# Patient Record
Sex: FEMALE | Race: WHITE | NOT HISPANIC OR LATINO
[De-identification: names, ages, dates, MRNs, and addresses within clinical notes are randomized per-mention and may not be internally consistent; named-entity substitution may affect disease eponyms.]

---

## 2017-03-17 PROBLEM — Z00.00 ENCOUNTER FOR PREVENTIVE HEALTH EXAMINATION: Status: ACTIVE | Noted: 2017-03-17

## 2017-05-01 ENCOUNTER — APPOINTMENT (OUTPATIENT)
Dept: ANTEPARTUM | Facility: CLINIC | Age: 62
End: 2017-05-01

## 2017-05-23 ENCOUNTER — OUTPATIENT (OUTPATIENT)
Dept: OUTPATIENT SERVICES | Facility: HOSPITAL | Age: 62
LOS: 1 days | Discharge: HOME | End: 2017-05-23

## 2017-06-28 DIAGNOSIS — Z01.818 ENCOUNTER FOR OTHER PREPROCEDURAL EXAMINATION: ICD-10-CM

## 2017-06-28 DIAGNOSIS — N95.0 POSTMENOPAUSAL BLEEDING: ICD-10-CM

## 2024-03-17 ENCOUNTER — EMERGENCY (EMERGENCY)
Facility: HOSPITAL | Age: 69
LOS: 0 days | Discharge: HOME | End: 2024-03-17
Attending: STUDENT IN AN ORGANIZED HEALTH CARE EDUCATION/TRAINING PROGRAM
Payer: MEDICAID

## 2024-03-17 ENCOUNTER — RESULT REVIEW (OUTPATIENT)
Age: 69
End: 2024-03-17

## 2024-03-17 VITALS
OXYGEN SATURATION: 99 % | RESPIRATION RATE: 20 BRPM | DIASTOLIC BLOOD PRESSURE: 88 MMHG | HEART RATE: 76 BPM | SYSTOLIC BLOOD PRESSURE: 147 MMHG | TEMPERATURE: 98 F

## 2024-03-17 VITALS
DIASTOLIC BLOOD PRESSURE: 56 MMHG | HEART RATE: 71 BPM | TEMPERATURE: 98 F | RESPIRATION RATE: 20 BRPM | SYSTOLIC BLOOD PRESSURE: 124 MMHG | OXYGEN SATURATION: 96 %

## 2024-03-17 DIAGNOSIS — N95.0 POSTMENOPAUSAL BLEEDING: ICD-10-CM

## 2024-03-17 DIAGNOSIS — R10.30 LOWER ABDOMINAL PAIN, UNSPECIFIED: ICD-10-CM

## 2024-03-17 DIAGNOSIS — N93.9 ABNORMAL UTERINE AND VAGINAL BLEEDING, UNSPECIFIED: ICD-10-CM

## 2024-03-17 LAB
ALBUMIN SERPL ELPH-MCNC: 4.4 G/DL — SIGNIFICANT CHANGE UP (ref 3.5–5.2)
ALP SERPL-CCNC: 86 U/L — SIGNIFICANT CHANGE UP (ref 30–115)
ALT FLD-CCNC: 17 U/L — SIGNIFICANT CHANGE UP (ref 0–41)
ANION GAP SERPL CALC-SCNC: 14 MMOL/L — SIGNIFICANT CHANGE UP (ref 7–14)
APPEARANCE UR: CLEAR — SIGNIFICANT CHANGE UP
AST SERPL-CCNC: 30 U/L — SIGNIFICANT CHANGE UP (ref 0–41)
BASOPHILS # BLD AUTO: 0.03 K/UL — SIGNIFICANT CHANGE UP (ref 0–0.2)
BASOPHILS NFR BLD AUTO: 0.4 % — SIGNIFICANT CHANGE UP (ref 0–1)
BILIRUB SERPL-MCNC: 0.4 MG/DL — SIGNIFICANT CHANGE UP (ref 0.2–1.2)
BILIRUB UR-MCNC: NEGATIVE — SIGNIFICANT CHANGE UP
BUN SERPL-MCNC: 13 MG/DL — SIGNIFICANT CHANGE UP (ref 10–20)
CALCIUM SERPL-MCNC: 9.4 MG/DL — SIGNIFICANT CHANGE UP (ref 8.4–10.5)
CHLORIDE SERPL-SCNC: 102 MMOL/L — SIGNIFICANT CHANGE UP (ref 98–110)
CO2 SERPL-SCNC: 24 MMOL/L — SIGNIFICANT CHANGE UP (ref 17–32)
COLOR SPEC: YELLOW — SIGNIFICANT CHANGE UP
CREAT SERPL-MCNC: 0.9 MG/DL — SIGNIFICANT CHANGE UP (ref 0.7–1.5)
DIFF PNL FLD: ABNORMAL
EGFR: 70 ML/MIN/1.73M2 — SIGNIFICANT CHANGE UP
EOSINOPHIL # BLD AUTO: 0.1 K/UL — SIGNIFICANT CHANGE UP (ref 0–0.7)
EOSINOPHIL NFR BLD AUTO: 1.2 % — SIGNIFICANT CHANGE UP (ref 0–8)
GLUCOSE SERPL-MCNC: 90 MG/DL — SIGNIFICANT CHANGE UP (ref 70–99)
GLUCOSE UR QL: NEGATIVE MG/DL — SIGNIFICANT CHANGE UP
HCT VFR BLD CALC: 42 % — SIGNIFICANT CHANGE UP (ref 37–47)
HGB BLD-MCNC: 14 G/DL — SIGNIFICANT CHANGE UP (ref 12–16)
IMM GRANULOCYTES NFR BLD AUTO: 0.2 % — SIGNIFICANT CHANGE UP (ref 0.1–0.3)
KETONES UR-MCNC: ABNORMAL MG/DL
LEUKOCYTE ESTERASE UR-ACNC: ABNORMAL
LIDOCAIN IGE QN: 29 U/L — SIGNIFICANT CHANGE UP (ref 7–60)
LYMPHOCYTES # BLD AUTO: 2.36 K/UL — SIGNIFICANT CHANGE UP (ref 1.2–3.4)
LYMPHOCYTES # BLD AUTO: 28.2 % — SIGNIFICANT CHANGE UP (ref 20.5–51.1)
MCHC RBC-ENTMCNC: 28.1 PG — SIGNIFICANT CHANGE UP (ref 27–31)
MCHC RBC-ENTMCNC: 33.3 G/DL — SIGNIFICANT CHANGE UP (ref 32–37)
MCV RBC AUTO: 84.3 FL — SIGNIFICANT CHANGE UP (ref 81–99)
MONOCYTES # BLD AUTO: 0.7 K/UL — HIGH (ref 0.1–0.6)
MONOCYTES NFR BLD AUTO: 8.4 % — SIGNIFICANT CHANGE UP (ref 1.7–9.3)
NEUTROPHILS # BLD AUTO: 5.15 K/UL — SIGNIFICANT CHANGE UP (ref 1.4–6.5)
NEUTROPHILS NFR BLD AUTO: 61.6 % — SIGNIFICANT CHANGE UP (ref 42.2–75.2)
NITRITE UR-MCNC: NEGATIVE — SIGNIFICANT CHANGE UP
NRBC # BLD: 0 /100 WBCS — SIGNIFICANT CHANGE UP (ref 0–0)
PH UR: 6.5 — SIGNIFICANT CHANGE UP (ref 5–8)
PLATELET # BLD AUTO: 287 K/UL — SIGNIFICANT CHANGE UP (ref 130–400)
PMV BLD: 10.4 FL — SIGNIFICANT CHANGE UP (ref 7.4–10.4)
POTASSIUM SERPL-MCNC: 4.6 MMOL/L — SIGNIFICANT CHANGE UP (ref 3.5–5)
POTASSIUM SERPL-SCNC: 4.6 MMOL/L — SIGNIFICANT CHANGE UP (ref 3.5–5)
PROT SERPL-MCNC: 7.6 G/DL — SIGNIFICANT CHANGE UP (ref 6–8)
PROT UR-MCNC: SIGNIFICANT CHANGE UP MG/DL
RBC # BLD: 4.98 M/UL — SIGNIFICANT CHANGE UP (ref 4.2–5.4)
RBC # FLD: 13.9 % — SIGNIFICANT CHANGE UP (ref 11.5–14.5)
SODIUM SERPL-SCNC: 140 MMOL/L — SIGNIFICANT CHANGE UP (ref 135–146)
SP GR SPEC: 1.02 — SIGNIFICANT CHANGE UP (ref 1–1.03)
UROBILINOGEN FLD QL: 1 MG/DL — SIGNIFICANT CHANGE UP (ref 0.2–1)
WBC # BLD: 8.36 K/UL — SIGNIFICANT CHANGE UP (ref 4.8–10.8)
WBC # FLD AUTO: 8.36 K/UL — SIGNIFICANT CHANGE UP (ref 4.8–10.8)

## 2024-03-17 PROCEDURE — 76830 TRANSVAGINAL US NON-OB: CPT

## 2024-03-17 PROCEDURE — 74177 CT ABD & PELVIS W/CONTRAST: CPT | Mod: 26,MC

## 2024-03-17 PROCEDURE — 99285 EMERGENCY DEPT VISIT HI MDM: CPT

## 2024-03-17 PROCEDURE — 99284 EMERGENCY DEPT VISIT MOD MDM: CPT | Mod: 25

## 2024-03-17 PROCEDURE — 80053 COMPREHEN METABOLIC PANEL: CPT

## 2024-03-17 PROCEDURE — 58100 BIOPSY OF UTERUS LINING: CPT

## 2024-03-17 PROCEDURE — 36415 COLL VENOUS BLD VENIPUNCTURE: CPT

## 2024-03-17 PROCEDURE — 74177 CT ABD & PELVIS W/CONTRAST: CPT | Mod: MC

## 2024-03-17 PROCEDURE — 76830 TRANSVAGINAL US NON-OB: CPT | Mod: 26

## 2024-03-17 PROCEDURE — 85025 COMPLETE CBC W/AUTO DIFF WBC: CPT

## 2024-03-17 PROCEDURE — 83690 ASSAY OF LIPASE: CPT

## 2024-03-17 PROCEDURE — 88305 TISSUE EXAM BY PATHOLOGIST: CPT | Mod: 26,59

## 2024-03-17 PROCEDURE — 88305 TISSUE EXAM BY PATHOLOGIST: CPT

## 2024-03-17 PROCEDURE — 87086 URINE CULTURE/COLONY COUNT: CPT

## 2024-03-17 PROCEDURE — 81001 URINALYSIS AUTO W/SCOPE: CPT

## 2024-03-17 NOTE — ED ADULT NURSE NOTE - OBJECTIVE STATEMENT
Pt c/o vaginal bleeding intermittently x 6 years. Did not follow up with her OBGYN accompanied by abd pain.

## 2024-03-17 NOTE — ED ADULT NURSE NOTE - NSFALLUNIVINTERV_ED_ALL_ED
Bed/Stretcher in lowest position, wheels locked, appropriate side rails in place/Call bell, personal items and telephone in reach/Instruct patient to call for assistance before getting out of bed/chair/stretcher/Non-slip footwear applied when patient is off stretcher/[place] to call system/Physically safe environment - no spills, clutter or unnecessary equipment/Purposeful proactive rounding/Room/bathroom lighting operational, light cord in reach

## 2024-03-17 NOTE — ED PROVIDER NOTE - OBJECTIVE STATEMENT
Patient is a 69-year-old female with no past medical history who is Thai speaking presents with daughter who is translating for evaluation of continuous vaginal bleeding over the past 2 weeks.  They know that patient has had on and off vaginal bleeding for 5 to 6 years.  They had initially followed up with [facility]YN for an ultrasound but did not follow-up for further testing or results.  She admits to some lower abdominal discomfort and pressure.  Patient wearing pads and notes continued bleeding with clots.  She denies any fever, cough, sugar, chills, nausea, vomiting, chest pain, shortness of breath, urinary complaints.

## 2024-03-17 NOTE — ED PROVIDER NOTE - PATIENT/CAREGIVER ACCEPTED INTERPRETER SERVICES
Addendum  created 04/05/18 0904 by Goran TALAVERA CRNA    Anesthesia Intra Flowsheets edited       yes

## 2024-03-17 NOTE — ED PROVIDER NOTE - NSFOLLOWUPINSTRUCTIONS_ED_ALL_ED_FT
FOLLOW UP WITH A [facility] OB GYN     Our Emergency Department Referral Coordinators will be reaching out to you in the next 24-48 hours from 9:00am to 5:00pm with a follow up appointment. Please expect a phone call from the hospital in that time frame. If you do not receive a call or if you have any questions or concerns, you can reach them at   [phone]    Abnormal Uterine Bleeding  Abnormal uterine bleeding is unusual bleeding from the uterus. It includes:    Bleeding or spotting between periods.  Bleeding after sex.  Bleeding that is heavier than normal.  Periods that last longer than usual.  Bleeding after menopause.    Abnormal uterine bleeding can affect women at various stages in life, including teenagers, women in their reproductive years, pregnant women, and women who have reached menopause. Common causes of abnormal uterine bleeding include:    Pregnancy.  Growths of tissue (polyps).  A noncancerous tumor in the uterus (fibroid).  Infection.  Cancer.  Hormonal imbalances.    ImageAny type of abnormal bleeding should be evaluated by a health care provider. Many cases are minor and simple to treat, while others are more serious. Treatment will depend on the cause of the bleeding.    Follow these instructions at home:  Monitor your condition for any changes.  Do not use tampons, douche, or have sex if told by your health care provider.  Change your pads often.  Get regular exams that include pelvic exams and cervical cancer screening.  Keep all follow-up visits as told by your health care provider. This is important.  Contact a health care provider if:  Your bleeding lasts for more than one week.  You feel dizzy at times.  You feel nauseous or you vomit.  Get help right away if:  You pass out.  Your bleeding soaks through a pad every hour.  You have abdominal pain.  You have a fever.  You become sweaty or weak.  You pass large blood clots from your vagina.  Summary  Abnormal uterine bleeding is unusual bleeding from the uterus.  Any type of abnormal bleeding should be evaluated by a health care provider. Many cases are minor and simple to treat, while others are more serious.  Treatment will depend on the cause of the bleeding.  This information is not intended to replace advice given to you by your health care provider. Make sure you discuss any questions you have with your health care provider.

## 2024-03-17 NOTE — CONSULT NOTE ADULT - ASSESSMENT
68yo P3, postmenopausal, no sig PMHx, with PMB, s/p endometrial and cervical biopsies hemodynamically and clinically stable     - No acute gyn intervention   - F/u Endometrial and Cervical biopsies   - f/u CT read   - Recommend close follow up with Gyn; will refer to Gyn Onc if biopsies are positive for malignancy   - Dispo per FUAD Caruso and Dr. Adorno.   68yo P3, postmenopausal, no sig PMHx, with PMB, s/p endometrial and cervical biopsies hemodynamically and clinically stable     - No acute gyn intervention   - F/u Endometrial and Cervical biopsies   - f/u CT read   - Recommend close follow up with Gyn; will refer to Gyn Onc if biopsies are positive for malignancy   - Please provide [facility] information on Discharge for f/u in clinic in 1-2 weeks   - Dispo per ED    Rodríguez Caruso and Dr. Adorno.

## 2024-03-17 NOTE — CONSULT NOTE ADULT - SUBJECTIVE AND OBJECTIVE BOX
Chief Complaint: vaginal bleeding    Welsh speaking, son at bedside to translate    HPI:  70yo P3, postmenopausal, with no significant PMHx, presenting to ED For 2 week of vaginal bleeding.  States the bleeding is like a period, moderately heavy.  Went through menopause at age 48yo with a few episodes of spotting but nothing like this before.  Has not seen a Gyn in 5-6 years.  Also reporting fatigue and decreased appetite.  Denies any change in weight, night sweats. Denies family history cancer.      Ob/Gyn History:  P3, NSVDx3   Denies history of ovarian cysts, uterine fibroids, abnormal paps, or STIs    Denies the following: constitutional symptoms, visual symptoms, cardiovascular symptoms, respiratory symptoms, GI symptoms, musculoskeletal symptoms, skin symptoms, neurologic symptoms, hematologic symptoms, allergic symptoms, psychiatric symptoms  Except any pertinent positives listed.     Home Medications:      Allergies  No Known Allergies  Intolerances    PAST MEDICAL & SURGICAL HISTORY:      FAMILY HISTORY:      SOCIAL HISTORY: Denies cigarette use, alcohol use, or illicit drug use    Vital Signs Last 24 Hrs  T(F): 97.9 (17 Mar 2024 15:51), Max: 98.3 (17 Mar 2024 12:39)  HR: 71 (17 Mar 2024 15:51) (71 - 76)  BP: 124/56 (17 Mar 2024 15:51) (124/56 - 147/88)  RR: 20 (17 Mar 2024 15:51) (20 - 20)    General Appearance - AAOx3, NAD    Abdomen - Soft, distended, + fluid wave,  nondistended, no rebound, no rigidity, no guarding, bowel sounds present    GYN/Pelvis:  Vagina - normal vaginal mucosa   Cervix - small polypoid tissue on cervix   Uterus - limited bimanual due to habitus, anteverted   Adnexa - nontender, no appreciable masses.     R/B/A of endometrial biopsy discussed with patient, including bleeding, infection, damage to uterus. All questions answered, consent signed.  Patient placed in lithotomy position. Speculum inserted into the vagina to visualize the cervix, cervix prepped with betadine. Cervix grasped at 12 o'clock position with ring forceps.  Uterus sounded to 8cm with endometrial biopsy pipelle.  Pipelle passed 3x obtained moderate endometrial tissue.  Small cervical polypoid like lesion noted, and removed with ring forceps.  Good hemostasis.  Patient tolerated procedure well.     Meds:         LABS:                        14.0   8.36  )-----------( 287      ( 17 Mar 2024 14:00 )             42.0         03-17    140  |  102  |  13  ----------------------------<  90  4.6   |  24  |  0.9    Ca    9.4      17 Mar 2024 14:00    TPro  7.6  /  Alb  4.4  /  TBili  0.4  /  DBili  x   /  AST  30  /  ALT  17  /  AlkPhos  86        Urinalysis Basic - ( 17 Mar 2024 14:01 )    Color: Yellow / Appearance: Clear / S.023 / pH: x  Gluc: x / Ketone: Trace mg/dL  / Bili: Negative / Urobili: 1.0 mg/dL   Blood: x / Protein: Trace mg/dL / Nitrite: Negative   Leuk Esterase: Small / RBC: 168 /HPF / WBC 13 /HPF   Sq Epi: x / Non Sq Epi: 5 /HPF / Bacteria: Occasional /HPF    RADIOLOGY & ADDITIONAL STUDIES:  < from: US Transvaginal (24 @ 14:59) >    ACC: [id number] EXAM:  US TRANSVAGINAL   ORDERED BY: SAMY REN     PROCEDURE DATE:  2024          INTERPRETATION:  CLINICAL INFORMATION: Postmenopausal vaginal bleeding.    LMP: Postmenopausal    COMPARISON: Pelvic sonogram 2017    TECHNIQUE:  Endovaginal and transabdominal pelvic sonogram. Color and Spectral   Doppler was performed.    FINDINGS:    Uterus: 8.4 cm x 3.7 cm x 4.5 cm.  Endometrium: 17 mm. Echogenic focus in the endometrium measuring   approximately 5 mm with suggestionof vascular flow, possibly represent a   polyp.    Right ovary: 1.7 cm x 1.1 cm x 1.5 cm. Color Doppler flow is demonstrated.  Left ovary: 2.0 cm x 1.3 cm x 2.0 cm. Color Doppler flow is demonstrated.    Fluid: None.    IMPRESSION:    Thickened endometrium measuring approximately 17 mm with suggestion of a   5 mm endometrial polyp. A GYN evaluation is suggested.    --- End of Report ---          CIELO CORTEZ MD; Resident Radiologist  This document has been electronically signed.  PALOMO IRIZARRY MD; Attending Radiologist  This document has been electronically signed. Mar 17 2024  5:08PM    < end of copied text >

## 2024-03-17 NOTE — ED PROVIDER NOTE - PROGRESS NOTE DETAILS
KA - GYN cleared patient and sent biopsies. Will dc with outpatient [facility] ob gyn follow up.

## 2024-03-17 NOTE — ED PROVIDER NOTE - ATTENDING APP SHARED VISIT CONTRIBUTION OF CARE
69-year-old female with no past medical history who presents with vaginal bleeding.  Patient states that for the last 5 to 6 years she has been having vaginal bleeding on and off.  Patient has not followed up with her OB/GYN in the last 5 to 6 years or primary care doctor.  Patient also states that in the last couple of days she has been having worsening lower abdominal pain.  Patient denies any other medical complaints.    VITAL SIGNS: I have reviewed nursing notes and confirm.  CONSTITUTIONAL: non-toxic, well appearing  SKIN: no rash, no petechiae.  EYES:  EOMI, pink conjunctiva, anicteric  ENT: tongue midline, no exudates, MMM  NECK: Supple; no meningismus, no JVD  CARD: RRR, no murmurs, equal radial pulses bilaterally 2+  RESP: CTAB, no respiratory distress  ABD: Soft, non-tender, non-distended, no peritoneal signs, no HSM, no CVA tenderness  : mild blood in the vaginal vault, no cmt or adnexa tenderness, however, at 1-2 o clock susan is a small 1 cm mass noted at the cervix. os closed (KAREEM Mcghee present during exam)         69-year-old female that presents with vaginal bleeding.  Imaging UA labs reassess dispo pending

## 2024-03-17 NOTE — ED PROVIDER NOTE - PATIENT PORTAL LINK FT
You can access the FollowMyHealth Patient Portal offered by [facility] by registering at the following website: [email or web address]. By joining [facility]’s FollowMyHealth portal, you will also be able to view your health information using other applications (apps) compatible with our system.

## 2024-03-17 NOTE — ED PROVIDER NOTE - PHYSICAL EXAMINATION
As Follows:  CONST: Well appearing in NAD  EYES: PERRL, EOMI, Sclera and conjunctiva clear.   CARD: No murmurs, rubs, or gallops; Normal rate and rhythm  RESP: BS Equal B/L, No wheezes, rhonchi or rales. No distress or accessory breathing  GI: Soft, non-tender, non-distended.  : See Attending note for further description.   SKIN: Warm, dry, no acute rashes. MMM  NEURO: A&Ox3, No focal deficits. Strength and sensation intact. Steady gait

## 2024-03-17 NOTE — ED PROVIDER NOTE - CLINICAL SUMMARY MEDICAL DECISION MAKING FREE TEXT BOX
69-year-old female that presents with vaginal bleeding.  Imaging UA labs. ob/gyn evaluated and cleared pt. pt hemodynamically stable. Labs were ordered and reviewed.  Imaging was ordered and reviewed by me.  Appropriate medications for patient's presenting complaints were ordered and effects were reassessed.  Patient's records (prior hospital, ED visit, and/or nursing home notes if available) were reviewed.  Additional history was obtained from EMS, family, and/or PCP (where available).  Escalation to admission/observation was considered. However patient feels much better and is comfortable with discharge.  Appropriate follow-up was arranged.     I have discussed the discharge plan with the patient. The patient agrees with the plan, as discussed.  The patient understands Emergency Department diagnosis is a preliminary diagnosis often based on limited information and that the patient must adhere to the follow-up plan as discussed.  The patient understands that if the symptoms worsen or if prescribed medications do not have the desired/planned effect that the patient may return to the Emergency Department at any time for further evaluation and treatment.

## 2024-03-17 NOTE — ED PROVIDER NOTE - NSFOLLOWUPCLINICS_GEN_ALL_ED_FT
[facility]  OB/GYN  [address] [place] [address]  Phone: [phone]  Fax:   Follow Up Time: 7-10 Days

## 2024-03-17 NOTE — ED PROVIDER NOTE - ADDITIONAL NOTES AND INSTRUCTIONS:
Ms Early was seen in the Emergency Department on 3/17/24 and can return to school or work by the listed date with activity as tolerated.

## 2024-03-18 LAB
CULTURE RESULTS: SIGNIFICANT CHANGE UP
SPECIMEN SOURCE: SIGNIFICANT CHANGE UP

## 2024-03-20 LAB — SURGICAL PATHOLOGY STUDY: SIGNIFICANT CHANGE UP

## 2024-03-27 ENCOUNTER — APPOINTMENT (OUTPATIENT)
Dept: OBGYN | Facility: CLINIC | Age: 69
End: 2024-03-27
Payer: MEDICAID

## 2024-03-27 ENCOUNTER — OUTPATIENT (OUTPATIENT)
Dept: OUTPATIENT SERVICES | Facility: HOSPITAL | Age: 69
LOS: 1 days | End: 2024-03-27
Payer: MEDICAID

## 2024-03-27 VITALS
WEIGHT: 202 LBS | HEIGHT: 57 IN | DIASTOLIC BLOOD PRESSURE: 73 MMHG | SYSTOLIC BLOOD PRESSURE: 136 MMHG | BODY MASS INDEX: 43.58 KG/M2

## 2024-03-27 DIAGNOSIS — N95.0 POSTMENOPAUSAL BLEEDING: ICD-10-CM

## 2024-03-27 DIAGNOSIS — Z00.00 ENCOUNTER FOR GENERAL ADULT MEDICAL EXAMINATION WITHOUT ABNORMAL FINDINGS: ICD-10-CM

## 2024-03-27 PROCEDURE — 99203 OFFICE O/P NEW LOW 30 MIN: CPT

## 2024-03-27 PROCEDURE — 99213 OFFICE O/P EST LOW 20 MIN: CPT

## 2024-03-27 NOTE — HISTORY OF PRESENT ILLNESS
[FreeTextEntry1] : Accompanied by daughter, Lyn.  Patient is Comoran speaking only.   70yo P3, postmenopausal, no known medical problems, presenting for ED follow up for PMB.  Seen in ED on 3/17 for 2 weeks of vaginal bleeding.  States the bleeding continued for another week.  She has had 2 days free from vaginal bleeding.  Bleeding was moderately heavy.  Went through menopause at 46yo, had had episodes of vaginal spotting in past, no biopsies.  Has not seen Gyn in 5-6 years. Reports fatigue and decreased appetite.   Denies any change in weight, night sweats. Denies family history cancer.  EMBx in ED:  1. Cervix biopsy: - Ectocervical squamous mucosa with no significant pathological changes. 2. Endometrial biopsy: -  Fragments of polypoid endometrium with thick-walled blood vessels and inactive glands, suggestive of endometrial polyp. - Microglandular hyperplasia with squamous metaplasia and scant unremarkable endocervical glands. - Abundant background blood clots with fibrin degradation and inflammatory cells.  Was seen by Dr. Mckeon yesterday (3/26) who did exam and pap smear.    OBHx: P3, NSVDs  GynHx: no gynecologic care for 5 years. denies h/o fibroids, cysts, abnormal paps, STIs [Post-Menopause, No Sxs] : post-menopausal, currently without symptoms [No] : Patient does not have concerns regarding sex

## 2024-04-01 ENCOUNTER — OUTPATIENT (OUTPATIENT)
Dept: OUTPATIENT SERVICES | Facility: HOSPITAL | Age: 69
LOS: 1 days | End: 2024-04-01
Payer: MEDICAID

## 2024-04-01 VITALS
OXYGEN SATURATION: 96 % | SYSTOLIC BLOOD PRESSURE: 148 MMHG | HEART RATE: 66 BPM | WEIGHT: 201.06 LBS | RESPIRATION RATE: 18 BRPM | DIASTOLIC BLOOD PRESSURE: 90 MMHG | HEIGHT: 60 IN | TEMPERATURE: 97 F

## 2024-04-01 DIAGNOSIS — Z01.818 ENCOUNTER FOR OTHER PREPROCEDURAL EXAMINATION: ICD-10-CM

## 2024-04-01 DIAGNOSIS — Z98.890 OTHER SPECIFIED POSTPROCEDURAL STATES: Chronic | ICD-10-CM

## 2024-04-01 DIAGNOSIS — N95.0 POSTMENOPAUSAL BLEEDING: ICD-10-CM

## 2024-04-01 PROCEDURE — 93005 ELECTROCARDIOGRAM TRACING: CPT

## 2024-04-01 PROCEDURE — 93010 ELECTROCARDIOGRAM REPORT: CPT

## 2024-04-01 PROCEDURE — 99214 OFFICE O/P EST MOD 30 MIN: CPT | Mod: 25

## 2024-04-01 NOTE — H&P PST ADULT - NSICDXPASTSURGICALHX_GEN_ALL_CORE_FT
PAST SURGICAL HISTORY:  No significant past surgical history PAST SURGICAL HISTORY:  H/O cervical biopsy

## 2024-04-01 NOTE — H&P PST ADULT - HISTORY OF PRESENT ILLNESS
70 Y/O FEMALE PT TO PAST WITH C/O POSTMENOPAUSAL BLEEDING,    FOR              PT NOW FOR SCHEDULED PROCEDURE ( HYSTEROSCOPY, D & C, REMOVAL OF PYLP FIBROID) . PT DENIES ANY CP SOB PALP COUGH DYSURIA FEVER URI.   Anesthesia Alert  NO--Difficult Airway  NO--History of neck surgery or radiation  NO--Limited ROM of neck  NO--History of Malignant hyperthermia  NO--Personal or family history of Pseudocholinesterase deficiency.  NO--Prior Anesthesia Complication  NO--Latex Allergy  NO--Loose teeth  NO--History of Rheumatoid Arthritis  NO--MARY  NO--Bleeding risk  NO--Other_____   68 Y/O FEMALE PT TO PAST WITH C/O POSTMENOPAUSAL BLEEDING SINCE 2017 , PT C/O RECENT BLEEDING PAST 2 WEEKS PT NOW FOR SCHEDULED PROCEDURE ( HYSTEROSCOPY, D & C, REMOVAL OF PYLP FIBROID) . PT DENIES ANY CP SOB PALP COUGH DYSURIA FEVER URI.   Anesthesia Alert  NO--Difficult Airway  NO--History of neck surgery or radiation  NO--Limited ROM of neck  NO--History of Malignant hyperthermia  NO--Personal or family history of Pseudocholinesterase deficiency.  NO--Prior Anesthesia Complication  NO--Latex Allergy  NO--Loose teeth  NO--History of Rheumatoid Arthritis  NO--MARY  NO--Bleeding risk  NO--Other_____  Revised Cardiac Risk Index for Pre-Operative Risk from [employer]  on 4/1/2024  ** All calculations should be rechecked by clinician prior to use **    RESULT SUMMARY:  0 points  Class I Risk    3.9 %  30-day risk of death, MI, or cardiac arrest    From [employer] 2017. These numbers are higher than those from the original study (Desmond 1999). See Evidence for details.      INPUTS:  Elevated-risk surgery —> 0 = No  History of ischemic heart disease —> 0 = No  History of congestive heart failure —> 0 = No  History of cerebrovascular disease —> 0 = No  Pre-operative treatment with insulin —> 0 = No  Pre-operative creatinine >2 mg/dL / 176.8 µmol/L —> 0 = No  Duke Activity Status Index (DASI) from [employer]  on 4/1/2024  ** All calculations should be rechecked by clinician prior to use **    RESULT SUMMARY:  42.45 points  The higher the score (maximum 58.2), the higher the functional status.    7.96 METs        INPUTS:  Take care of self —> 2.75 = Yes  Walk indoors —> 1.75 = Yes  Walk 1&ndash;2 blocks on level ground —> 2.75 = Yes  Climb a flight of stairs or walk up a hill —> 5.5 = Yes  Run a short distance —> 8 = Yes  Do light work around the house —> 2.7 = Yes  Do moderate work around the house —> 3.5 = Yes  Do heavy work around the house —> 8 = Yes  Do yardwork —> 0 = No  Have sexual relations —> 0 = No  Participate in moderate recreational activities —> 0 = No  Participate in strenuous sports —> 7.5 = Yes   70 Y/O FEMALE PT TO PAST WITH C/O POSTMENOPAUSAL BLEEDING SINCE 2017 , PT C/O RECENT BLEEDING PAST 2 WEEKS PT NOW FOR SCHEDULED PROCEDURE ( HYSTEROSCOPY, D & C, REMOVAL OF PYLP FIBROID) . PT DENIES ANY CP SOB PALP COUGH DYSURIA FEVER URI.   Anesthesia Alert  NO--Difficult Airway  NO--History of neck surgery or radiation  NO--Limited ROM of neck  NO--History of Malignant hyperthermia  NO--Personal or family history of Pseudocholinesterase deficiency.  NO--Prior Anesthesia Complication  NO--Latex Allergy  NO--Loose teeth  NO--History of Rheumatoid Arthritis  NO--MARY  NO--Bleeding risk  NO--Other_____  Revised Cardiac Risk Index for Pre-Operative Risk from [employer]  on 4/1/2024  ** All calculations should be rechecked by clinician prior to use **    RESULT SUMMARY:  0 points  Class I Risk    3.9 %  30-day risk of death, MI, or cardiac arrest    From [employer] 2017. These numbers are higher than those from the original study ([employer] 1999). See Evidence for details.      INPUTS:  Elevated-risk surgery —> 0 = No  History of ischemic heart disease —> 0 = No  History of congestive heart failure —> 0 = No  History of cerebrovascular disease —> 0 = No  Pre-operative treatment with insulin —> 0 = No  Pre-operative creatinine >2 mg/dL / 176.8 µmol/L —> 0 = No  Duke Activity Status Index (DASI) from [employer]  on 4/1/2024  ** All calculations should be rechecked by clinician prior to use **    RESULT SUMMARY:  42.45 points  The higher the score (maximum 58.2), the higher the functional status.    7.96 METs        INPUTS:  Take care of self —> 2.75 = Yes  Walk indoors —> 1.75 = Yes  Walk 1&ndash;2 blocks on level ground —> 2.75 = Yes  Climb a flight of stairs or walk up a hill —> 5.5 = Yes  Run a short distance —> 8 = Yes  Do light work around the house —> 2.7 = Yes  Do moderate work around the house —> 3.5 = Yes  Do heavy work around the house —> 8 = Yes  Do yardwork —> 0 = No  Have sexual relations —> 0 = No  Participate in moderate recreational activities —> 0 = No  Participate in strenuous sports —> 7.5 = Yes   68 Y/O FEMALE PT TO PAST, ACCOMPANIED BY DAUGHTER,  WITH C/O POSTMENOPAUSAL BLEEDING SINCE 2017 , PT C/O RECENT BLEEDING PAST 2 WEEKS PT NOW FOR SCHEDULED PROCEDURE ( HYSTEROSCOPY, D & C, REMOVAL OF POLYP FIBROID) . PT DENIES ANY CP SOB PALP COUGH DYSURIA FEVER URI.   Anesthesia Alert  NO--Difficult Airway  NO--History of neck surgery or radiation  NO--Limited ROM of neck  NO--History of Malignant hyperthermia  NO--Personal or family history of Pseudocholinesterase deficiency.  NO--Prior Anesthesia Complication  NO--Latex Allergy  NO--Loose teeth  NO--History of Rheumatoid Arthritis  NO--MARY  NO--Bleeding risk  NO--Other_____  Revised Cardiac Risk Index for Pre-Operative Risk from [employer]  on 4/1/2024  ** All calculations should be rechecked by clinician prior to use **    RESULT SUMMARY:  0 points  Class I Risk    3.9 %  30-day risk of death, MI, or cardiac arrest    From [employer] 2017. These numbers are higher than those from the original study (Desmond 1999). See Evidence for details.      INPUTS:  Elevated-risk surgery —> 0 = No  History of ischemic heart disease —> 0 = No  History of congestive heart failure —> 0 = No  History of cerebrovascular disease —> 0 = No  Pre-operative treatment with insulin —> 0 = No  Pre-operative creatinine >2 mg/dL / 176.8 µmol/L —> 0 = No  Duke Activity Status Index (DASI) from [employer]  on 4/1/2024  ** All calculations should be rechecked by clinician prior to use **    RESULT SUMMARY:  42.45 points  The higher the score (maximum 58.2), the higher the functional status.    7.96 METs        INPUTS:  Take care of self —> 2.75 = Yes  Walk indoors —> 1.75 = Yes  Walk 1&ndash;2 blocks on level ground —> 2.75 = Yes  Climb a flight of stairs or walk up a hill —> 5.5 = Yes  Run a short distance —> 8 = Yes  Do light work around the house —> 2.7 = Yes  Do moderate work around the house —> 3.5 = Yes  Do heavy work around the house —> 8 = Yes  Do yardwork —> 0 = No  Have sexual relations —> 0 = No  Participate in moderate recreational activities —> 0 = No  Participate in strenuous sports —> 7.5 = Yes

## 2024-04-02 DIAGNOSIS — N95.0 POSTMENOPAUSAL BLEEDING: ICD-10-CM

## 2024-04-02 DIAGNOSIS — Z01.818 ENCOUNTER FOR OTHER PREPROCEDURAL EXAMINATION: ICD-10-CM

## 2024-04-09 ENCOUNTER — OUTPATIENT (OUTPATIENT)
Dept: OUTPATIENT SERVICES | Facility: HOSPITAL | Age: 69
LOS: 1 days | Discharge: HOME | End: 2024-04-09
Payer: MEDICAID

## 2024-04-09 ENCOUNTER — TRANSCRIPTION ENCOUNTER (OUTPATIENT)
Age: 69
End: 2024-04-09

## 2024-04-09 ENCOUNTER — RESULT REVIEW (OUTPATIENT)
Age: 69
End: 2024-04-09

## 2024-04-09 VITALS
RESPIRATION RATE: 18 BRPM | HEART RATE: 61 BPM | DIASTOLIC BLOOD PRESSURE: 75 MMHG | SYSTOLIC BLOOD PRESSURE: 144 MMHG | OXYGEN SATURATION: 98 %

## 2024-04-09 VITALS
DIASTOLIC BLOOD PRESSURE: 63 MMHG | HEART RATE: 70 BPM | SYSTOLIC BLOOD PRESSURE: 132 MMHG | TEMPERATURE: 98 F | OXYGEN SATURATION: 96 % | WEIGHT: 201.06 LBS | RESPIRATION RATE: 18 BRPM | HEIGHT: 60 IN

## 2024-04-09 DIAGNOSIS — N95.0 POSTMENOPAUSAL BLEEDING: ICD-10-CM

## 2024-04-09 DIAGNOSIS — Z98.890 OTHER SPECIFIED POSTPROCEDURAL STATES: Chronic | ICD-10-CM

## 2024-04-09 PROCEDURE — 88305 TISSUE EXAM BY PATHOLOGIST: CPT

## 2024-04-09 PROCEDURE — 88305 TISSUE EXAM BY PATHOLOGIST: CPT | Mod: 26

## 2024-04-09 PROCEDURE — 58558 HYSTEROSCOPY BIOPSY: CPT

## 2024-04-09 RX ORDER — ACETAMINOPHEN 500 MG
650 TABLET ORAL ONCE
Refills: 0 | Status: DISCONTINUED | OUTPATIENT
Start: 2024-04-09 | End: 2024-04-09

## 2024-04-09 RX ORDER — HYDROMORPHONE HYDROCHLORIDE 2 MG/ML
0.5 INJECTION INTRAMUSCULAR; INTRAVENOUS; SUBCUTANEOUS
Refills: 0 | Status: DISCONTINUED | OUTPATIENT
Start: 2024-04-09 | End: 2024-04-09

## 2024-04-09 RX ORDER — SODIUM CHLORIDE 9 MG/ML
1000 INJECTION, SOLUTION INTRAVENOUS
Refills: 0 | Status: DISCONTINUED | OUTPATIENT
Start: 2024-04-09 | End: 2024-04-09

## 2024-04-09 RX ORDER — ONDANSETRON 8 MG/1
4 TABLET, FILM COATED ORAL ONCE
Refills: 0 | Status: DISCONTINUED | OUTPATIENT
Start: 2024-04-09 | End: 2024-04-09

## 2024-04-09 NOTE — PRE-ANESTHESIA EVALUATION ADULT - NSPROPOSEDPROCEDFT_GEN_ALL_CORE
examination under anesthesia, diagnostic hysteroscopy with myosure, removal of endometrial polyp fibroid, dilation and curettage, possible laparoscopy

## 2024-04-09 NOTE — CHART NOTE - NSCHARTNOTEFT_GEN_A_CORE
PACU ANESTHESIA ADMISSION NOTE      Procedure: Hysteroscopy with dilation and curettage of uterus using MyoSure device      Post op diagnosis:  Postmenopause bleeding        ____  Intubated  TV:______       Rate: ______      FiO2: ______    _x___  Patent Airway    _x___  Full return of protective reflexes    _x__  Full recovery from anesthesia / back to baseline status    Vitals:            T:    97.7            BP :   139/67             R:  12            Sat:  95             P:  76      Mental Status:  _x___ Awake   _____ Alert   _____ Drowsy   _____ Sedated    Nausea/Vomiting:  _x___  NO       ______Yes,   See Post - Op Orders         Pain Scale (0-10):  __0___    Treatment: ____ None    __x__ See Post - Op/PCA Orders    Post - Operative Fluids:   __x__ Oral   ____ See Post - Op Orders    Plan: Discharge:   _x___Home       _____Floor     _____Critical Care    _____  Other:_________________    Comments:  No anesthesia issues or complications noted.  Discharge when criteria met.

## 2024-04-09 NOTE — BRIEF OPERATIVE NOTE - NSICDXBRIEFPROCEDURE_GEN_ALL_CORE_FT
PROCEDURES:  Hysteroscopy with dilation and curettage of uterus using MyoSure device 09-Apr-2024 10:44:23  Magdalena Aparicio

## 2024-04-09 NOTE — PRE-ANESTHESIA EVALUATION ADULT - NSANTHADDINFOFT_GEN_ALL_CORE
risks, benefits, alternatives, discussed with the patient and she agrees to proceed as planned. Patient seen, examined, consent obtained prior to transfer to OR

## 2024-04-09 NOTE — BRIEF OPERATIVE NOTE - OPERATION/FINDINGS
Small approx 6 week size anteverted uterus on bimanual exam. On hysteroscopy, polyploid tissue throughout, discrete polyps noted at the level of the internal cervicl os. Small clarifications noted on left lateral lower uterine segment. Bl ostia visualized. Nl appearing vagina and cervix. Small cystocele appreciated.

## 2024-04-09 NOTE — ASU DISCHARGE PLAN (ADULT/PEDIATRIC) - CLICK TO LAUNCH ORM
Pt sleeping comfortably at this time, respirations equal and unlabored. No needs or complaints at this time. Safety measures maintained, stretcher locked and in lowest position, both side rails up. Pending bed placement. Pt aware of plan, verbalized understanding. .

## 2024-04-09 NOTE — ASU DISCHARGE PLAN (ADULT/PEDIATRIC) - CARE PROVIDER_API CALL
Arlene Torres  Obstetrics and Gynecology  [address] [place] [id number]  Phone: [phone]  Fax: [phone]  Follow Up Time: 2 weeks

## 2024-04-10 PROBLEM — Z78.9 OTHER SPECIFIED HEALTH STATUS: Chronic | Status: ACTIVE | Noted: 2024-04-01

## 2024-04-11 LAB — SURGICAL PATHOLOGY STUDY: SIGNIFICANT CHANGE UP

## 2024-04-15 ENCOUNTER — APPOINTMENT (OUTPATIENT)
Dept: OBGYN | Facility: CLINIC | Age: 69
End: 2024-04-15
Payer: MEDICAID

## 2024-04-15 ENCOUNTER — OUTPATIENT (OUTPATIENT)
Dept: OUTPATIENT SERVICES | Facility: HOSPITAL | Age: 69
LOS: 1 days | End: 2024-04-15
Payer: MEDICAID

## 2024-04-15 VITALS
WEIGHT: 203 LBS | BODY MASS INDEX: 43.8 KG/M2 | DIASTOLIC BLOOD PRESSURE: 80 MMHG | HEIGHT: 57 IN | SYSTOLIC BLOOD PRESSURE: 140 MMHG

## 2024-04-15 DIAGNOSIS — N72 INFLAMMATORY DISEASE OF CERVIX UTERI: ICD-10-CM

## 2024-04-15 DIAGNOSIS — N95.0 POSTMENOPAUSAL BLEEDING: ICD-10-CM

## 2024-04-15 DIAGNOSIS — N87.9 DYSPLASIA OF CERVIX UTERI, UNSPECIFIED: ICD-10-CM

## 2024-04-15 DIAGNOSIS — Z98.890 OTHER SPECIFIED POSTPROCEDURAL STATES: Chronic | ICD-10-CM

## 2024-04-15 DIAGNOSIS — N89.8 OTHER SPECIFIED NONINFLAMMATORY DISORDERS OF VAGINA: ICD-10-CM

## 2024-04-15 DIAGNOSIS — Z98.890 OTHER SPECIFIED POSTPROCEDURAL STATES: ICD-10-CM

## 2024-04-15 DIAGNOSIS — N84.0 POLYP OF CORPUS UTERI: ICD-10-CM

## 2024-04-15 PROCEDURE — 99213 OFFICE O/P EST LOW 20 MIN: CPT

## 2024-04-15 RX ORDER — METRONIDAZOLE 7.5 MG/G
0.75 GEL VAGINAL
Qty: 1 | Refills: 2 | Status: ACTIVE | COMMUNITY
Start: 2024-04-15 | End: 1900-01-01

## 2024-04-15 NOTE — HISTORY OF PRESENT ILLNESS
[FreeTextEntry1] : Pt s/p dx hysteroscopy on 4/9/24, here today for post op visit. Pt states she has vaginal odor. Denies fever or issues with urination or bowel movement. Pt had two episodes of spotting [Post-Menopause, No Sxs] : post-menopausal, currently without symptoms [No] : Patient does not have concerns regarding sex

## 2024-04-15 NOTE — PLAN
[FreeTextEntry1] : Pathology results discussed with patient and daughter who served as  rv for annual or prn

## 2024-04-15 NOTE — PHYSICAL EXAM
[Chaperone Present] : A chaperone was present in the examining room during all aspects of the physical examination [Appropriately responsive] : appropriately responsive [Alert] : alert [Oriented x3] : oriented x3

## 2024-04-17 DIAGNOSIS — N89.8 OTHER SPECIFIED NONINFLAMMATORY DISORDERS OF VAGINA: ICD-10-CM

## 2024-04-17 DIAGNOSIS — N95.0 POSTMENOPAUSAL BLEEDING: ICD-10-CM

## 2024-09-18 ENCOUNTER — APPOINTMENT (OUTPATIENT)
Dept: OTOLARYNGOLOGY | Facility: CLINIC | Age: 69
End: 2024-09-18

## 2024-09-25 ENCOUNTER — APPOINTMENT (OUTPATIENT)
Dept: OTOLARYNGOLOGY | Facility: CLINIC | Age: 69
End: 2024-09-25
Payer: MEDICAID

## 2024-09-25 DIAGNOSIS — H93.19 TINNITUS, UNSPECIFIED EAR: ICD-10-CM

## 2024-09-25 DIAGNOSIS — R42 DIZZINESS AND GIDDINESS: ICD-10-CM

## 2024-09-25 DIAGNOSIS — H92.01 OTALGIA, RIGHT EAR: ICD-10-CM

## 2024-09-25 DIAGNOSIS — L29.9 PRURITUS, UNSPECIFIED: ICD-10-CM

## 2024-09-25 PROCEDURE — 92557 COMPREHENSIVE HEARING TEST: CPT

## 2024-09-25 PROCEDURE — 92550 TYMPANOMETRY & REFLEX THRESH: CPT | Mod: 52

## 2024-09-25 PROCEDURE — 99204 OFFICE O/P NEW MOD 45 MIN: CPT

## 2024-09-25 RX ORDER — MOMETASONE FUROATE 1 MG/G
0.1 CREAM TOPICAL TWICE DAILY
Qty: 1 | Refills: 3 | Status: ACTIVE | COMMUNITY
Start: 2024-09-25 | End: 1900-01-01

## 2024-09-25 NOTE — HISTORY OF PRESENT ILLNESS
[de-identified] : Oren # [id number] (declined), prefers son to interpret.  [id number]  Patient presents today c/o ear pain and dizziness. C/o of R ear discomfort, went to PCP and was given abx ear drops with improvement but would like to know if further medication can be used.  Reports noise in L ear. She states that she is able to hear but having noise in ear. Denies asking others to repeat thsemlves.  C/o of lightheadedness and headaches, denies room spinning. No longer having dizzy symptoms. Symptoms started 3 years ago.  Accompanied by son.

## 2024-11-06 ENCOUNTER — APPOINTMENT (OUTPATIENT)
Dept: OTOLARYNGOLOGY | Facility: CLINIC | Age: 69
End: 2024-11-06

## 2025-05-15 NOTE — PLAN
[FreeTextEntry1] : This is a 61 y.o. F w/ PMHx non obstructive Coronary artery disease, Hepatic steatosis, HLD, GERD, Obesity who presents for wellness exam. Last OV 02/28/2025. Patient states that yesterday she was experiencing bilateral LE edema, more on the left than the right. Patient states she went to vascular doctor yesterday and performed a b/l LE venous duplex and states that there was no DVT noted. Patient states swelling has improved.  Patient following with Dr. Shearer and started her on Rezdiffra 100mg daily.    Denies chest pain, palpitations, diaphoresis, vision changes, HA, dizziness, syncope, cough, wheezing, fever, chills, infection.
[FreeTextEntry1] : 68yo P4 with PMB   PMB:  - EMBx with polypoid tissue  - Will book EUA, hysteroscopy D&C and call patient with date and time.  - Discussed need for hysteroscopy D&C for evaluation of uterine cavity and more sampling to r/o malignancy.  Discussed risks include bleeding, infection, uterine perforation.  - Will need PST; patient has not had much medical care, does not have a primary care doctor.   - Signed record release for results from Dr. Montoya's office.